# Patient Record
Sex: FEMALE | Race: WHITE
[De-identification: names, ages, dates, MRNs, and addresses within clinical notes are randomized per-mention and may not be internally consistent; named-entity substitution may affect disease eponyms.]

---

## 2018-01-30 ENCOUNTER — HOSPITAL ENCOUNTER (OUTPATIENT)
Dept: HOSPITAL 56 - MW.SDS | Age: 68
LOS: 1 days | Discharge: HOME | End: 2018-01-31
Attending: OBSTETRICS & GYNECOLOGY
Payer: COMMERCIAL

## 2018-01-30 DIAGNOSIS — N81.2: Primary | ICD-10-CM

## 2018-01-30 LAB
CHLORIDE SERPL-SCNC: 108 MMOL/L (ref 98–110)
SODIUM SERPL-SCNC: 142 MMOL/L (ref 136–146)

## 2018-01-30 PROCEDURE — 58552 LAPARO-VAG HYST INCL T/O: CPT

## 2018-01-30 PROCEDURE — 57240 ANTERIOR COLPORRHAPHY: CPT

## 2018-01-30 PROCEDURE — 86901 BLOOD TYPING SEROLOGIC RH(D): CPT

## 2018-01-30 PROCEDURE — 0UT27ZZ RESECTION OF BILATERAL OVARIES, VIA NATURAL OR ARTIFICIAL OPENING: ICD-10-PCS | Performed by: OBSTETRICS & GYNECOLOGY

## 2018-01-30 PROCEDURE — 36415 COLL VENOUS BLD VENIPUNCTURE: CPT

## 2018-01-30 PROCEDURE — 0JQC3ZZ REPAIR PELVIC REGION SUBCUTANEOUS TISSUE AND FASCIA, PERCUTANEOUS APPROACH: ICD-10-PCS | Performed by: OBSTETRICS & GYNECOLOGY

## 2018-01-30 PROCEDURE — 85025 COMPLETE CBC W/AUTO DIFF WBC: CPT

## 2018-01-30 PROCEDURE — 86900 BLOOD TYPING SEROLOGIC ABO: CPT

## 2018-01-30 PROCEDURE — 88307 TISSUE EXAM BY PATHOLOGIST: CPT

## 2018-01-30 PROCEDURE — 86850 RBC ANTIBODY SCREEN: CPT

## 2018-01-30 PROCEDURE — 0UT77ZZ RESECTION OF BILATERAL FALLOPIAN TUBES, VIA NATURAL OR ARTIFICIAL OPENING: ICD-10-PCS | Performed by: OBSTETRICS & GYNECOLOGY

## 2018-01-30 PROCEDURE — 85027 COMPLETE CBC AUTOMATED: CPT

## 2018-01-30 PROCEDURE — 0UT97ZZ RESECTION OF UTERUS, VIA NATURAL OR ARTIFICIAL OPENING: ICD-10-PCS | Performed by: OBSTETRICS & GYNECOLOGY

## 2018-01-30 PROCEDURE — 80048 BASIC METABOLIC PNL TOTAL CA: CPT

## 2018-01-30 RX ADMIN — FENTANYL CITRATE PRN MCG: 50 INJECTION, SOLUTION INTRAMUSCULAR; INTRAVENOUS at 15:14

## 2018-01-30 RX ADMIN — KETOROLAC TROMETHAMINE SCH MG: 15 INJECTION, SOLUTION INTRAMUSCULAR; INTRAVENOUS at 20:55

## 2018-01-30 RX ADMIN — FENTANYL CITRATE PRN MCG: 50 INJECTION, SOLUTION INTRAMUSCULAR; INTRAVENOUS at 15:21

## 2018-01-30 NOTE — OR
SURGEON:

Teresita Nance M.D.

 

DATE OF PROCEDURE:  01/30/2018

 

PREOPERATIVE DIAGNOSIS:

Symptomatic incomplete uterovaginal prolapse.

 

POSTOPERATIVE DIAGNOSIS:

Symptomatic incomplete uterovaginal prolapse.

 

PROCEDURE:

Total vaginal hysterectomy, bilateral salpingo-oophorectomy, anterior

colporrhaphy with cystoscopy.

 

PRIMARY SURGEON:

Teresita Nance M.D.

 

ASSISTANT:

Basia Fuentes M.D.

 

ANESTHESIA:

General endotracheal anesthesia.

 

FLUIDS:

1100 mL crystalloid.

 

ESTIMATED BLOOD LOSS:

100 mL.

 

COMPLICATIONS:

None known.

 

FINDINGS:

A normal-appearing uterus, atrophic tubes and ovaries, third-degree uterine

prolapse, second-degree cystocele.

 

Bilateral patent ureters with cystoscopy, post procedure.

 

DISPOSITION:

The patient to PACU, stable.

 

INDICATIONS:

Rosemary is a 67-year-old postmenopausal female who has ongoing difficulties with

symptomatic uterine prolapse.  On examination, she does have third-degree

uterine prolapse with second-degree cystocele.  The posterior defect is minimal.

 

After discussing the options for treatment, she would like to proceed with

surgical intervention.  Risks of the procedure have been discussed with her and

proper consent obtained.

 

DESCRIPTION OF PROCEDURE:

The patient was taken to the operating room where she underwent general

endotracheal anesthesia.  She was placed in modified dorsal lithotomy position,

prepped and draped in the usual sterile fashion.  SCDs to lower extremities.

Clay to gravity.  Received Ancef prophylactically.  Time-out was performed.

 

Weighted speculum, anterior [place], was placed in the vagina.  Cervix grasped

with Willard clamp, tented downward, cervix circumscribed with Bovie cautery.

Anterior and posteriorly overlying mucosa was dissected sharply and bluntly from

underlying muscle.  Posteriorly, the perineum was tented downward and entered

sharply.  Longer weighted speculum was replaced with the shorter.

 

Anteriorly, the anterior cul-de-sac was now gently entered and [place] was placed

to mobilize the bladder away from the operative field.

 

Using Francesco clamps, the uterosacral ligaments on either side were transected and

suture ligated with 2-0 Vicryl.  Remainder of the suture will be 2-0 Vicryl

unless otherwise specified.

 

Another pedicle was secured on either side followed by a final pedicle

incorporating the utero tubo-ovarian pedicle.

 

Cervix and uterus were handed off to scrub tech.  A moist packing lap was now

gently placed to mobilize the bowel away from the operative field.  The left

ovary and tube complex was able to be isolated with Marques clamp.  A Cornelius

clamp x2 was placed.  Tube and ovary were now excised, will be sent to

pathology.  A tie on a pass was gently placed followed by suture tie along this

pedicle.

 

Similar fashion was performed on the patient's right side.  The right ovary was

much more adherent than the left.  I was able to gently secure it with a Downs

clamp, transect x2 Z-clamps, removed the tube and ovary, and secured the pedicle

with a tie on a pass followed by a suture tie.

 

The area of bleeding along the apex of this pedicle was able to be secured with

a figure-of-eight suture.

 

The remainder of pedicles were closely inspected.  No bleeding was noted.

 

Packing lap was now removed.  Attention was turned to performing anterior

repair.  In the midline at the site of the anterior portion of the cuff, this

was grasped with Allis clamps on either side.  Hydrodissection was performed

along the midline and a midline anterior vaginal mucosa incision was created

with Metzenbaum scissors.  I was able to grasp the mucosa with serial Allis

clamps and the overlying mucosa was now sharply and bluntly dissected away from

underlying muscularis tissue until stronger muscularis tissue was found more

laterally.  The stronger muscularis tissue was now re-plicated using the 2-0

Vicryl in an inverted mattress suture technique.  Excess vaginal mucosa was

trimmed.  The vaginal mucosa was reapproximated using 0 Vicryl in continuous

running locked fashion.

 

The uterosacral ligament on either side was able to be secured to the vaginal

apex.  The remainder of the vaginal cuff was now closed using 0 Vicryl in

continuous running locked fashion.  The cuff line was inspected and found to be

hemostatic.  Clay catheter was removed after balloon was desufflated.

Cystoscope was introduced using normal saline as distention media.  The dome of

the bladder was able to be visualized followed by the trigone.  The right

ureteral orifice followed by the left ureteral orifice was able to be

visualized.  Fluorescein dyed urine was seen streaming from them, helping to

ensure patency.  The cystoscope was now removed.  Bladder was drained.  Clay

catheter was replaced.  The cuff line was once again inspected and found to be

hemostatic.  Vaginal packing was gently placed.  Sponge, instrument, and needle

counts correct x2.  The patient tolerated this procedure well overall.  She will

go to PACU in stable condition.  Specimens to pathology.

 

 

YOAN / NASIMA

DD:  01/30/2018 15:01:05

DT:  01/30/2018 22:05:59

Job #:  [id number]

## 2018-01-30 NOTE — PCM.PREANE
Preanesthetic Assessment





- Anesthesia/Transfusion/Family Hx


Anesthesia History: Prior Anesthesia Without Reaction


Family History of Anesthesia Reaction: No


Transfusion History: No Prior Transfusion(s)





- Review of Systems


General: No Symptoms


Pulmonary: No Symptoms


Cardiovascular: No Symptoms


Gastrointestinal: No Symptoms


Neurological: No Symptoms


Other: Reports: None





- Physical Assessment


NPO Status Date: 01/29/18


O2 Sat by Pulse Oximetry: 97


Respiratory Rate: 16


Vital Signs: 





 Last Vital Signs











Temp  36.5 C   01/30/18 10:25


 


Pulse  79   01/30/18 10:25


 


Resp  16   01/30/18 10:25


 


BP  129/94 H  01/30/18 10:25


 


Pulse Ox  97   01/30/18 10:25











Height: 1.57 m


Weight: 60.328 kg


ASA Class: 1


Mental Status: Alert & Oriented x3


Dentition: Reports: Normal Dentition


ROM/Head Extension: Full


Lungs: Clear to Auscultation, Normal Respiratory Effort


Cardiovascular: Regular Rate, Regular Rhythm





- Lab


Values: 





 Laboratory Last Values











WBC  4.44 K/uL (4.0-11.0)   01/30/18  10:49    


 


RBC  4.16 M/uL (4.30-5.90)  L  01/30/18  10:49    


 


Hgb  13.1 g/dL (12.0-16.0)   01/30/18  10:49    


 


Hct  39.2 % (36.0-46.0)   01/30/18  10:49    


 


MCV  94.2 fL (80.0-98.0)   01/30/18  10:49    


 


MCH  31.5 pg (27.0-32.0)   01/30/18  10:49    


 


MCHC  33.4 g/dL (31.0-37.0)   01/30/18  10:49    


 


RDW Std Deviation  47.0 fl (28.0-62.0)   01/30/18  10:49    


 


RDW Coeff of Erik  14 % (11.0-15.0)   01/30/18  10:49    


 


Plt Count  205 K/uL (150-400)   01/30/18  10:49    


 


MPV  10.20 fL (7.40-12.00)   01/30/18  10:49    


 


Nucleated RBC %  0.0 /100WBC  01/30/18  10:49    


 


Nucleated RBCs #  0 K/uL  01/30/18  10:49    














- Allergies


Allergies/Adverse Reactions: 


 Allergies











Allergy/AdvReac Type Severity Reaction Status Date / Time


 


No Known Allergies Allergy   Verified 01/30/18 10:30














- Anesthesia Plan


Pre-Op Medication Ordered: None





- Acknowledgements


Anesthesia Type Planned: General Anesthesia


Pt an Appropriate Candidate for the Planned Anesthesia: Yes


Alternatives and Risks of Anesthesia Discussed w Pt/Guardian: Yes


Pt/Guardian Understands and Agrees with Anesthesia Plan: Yes





PreAnesthesia Questionnaire


HEENT History: Reports: Cataract, Glaucoma, Hard of Hearing


Other HEENT History: wears glasses,  [place] left ear


Cardiovascular History: Reports: High Cholesterol


Musculoskeletal History: Reports: Fracture


Other Musculoskeletal History: hx of fx arm


Neurological History: Reports: Concussion


Psychiatric History: Reports: Depression





- Past Surgical History


HEENT Surgical History: Reports: Cataract Surgery, Other (See Below)


Other HEENT Surgeries/Procedures: removal of cholestatoma x3 left ear


GI Surgical History: Reports: Appendectomy, Colonoscopy


Female  Surgical History: Reports: Tubal Ligation, Other (See Below)


Other Female  Surgeries/Procedures: laparoscopy





- SUBSTANCE USE


Smoking Status *Q: Former Smoker


Recreational Drug Use History: No





- HOME MEDS


Home Medications: 


 Home Meds





Aspirin [Adult Low Dose Aspirin EC] 81 mg PO DAILY 01/25/18 [History]


Calcium Carbonate/Vitamin D3 [Calcium 600 + Vit D 400 Softgl] 1 cap PO DAILY 01/ 25/18 [History]


Cyanocobalamin (Vitamin B-12) [Vitamin B-12] 500 mcg PO DAILY 01/25/18 [History]


Latanoprost [Xalatan 0.005% Ophth Soln] 1 drop EYEBOTH BEDTIME 01/25/18 [History

]


Magnesium 250 mg PO DAILY 01/25/18 [History]


Multivitamin [Multivitamins] 1 tab PO DAILY 01/25/18 [History]


Sertraline [Zoloft] 100 mg PO DAILY 01/25/18 [History]


atorvaSTATin [Lipitor] 20 mg PO DAILY 01/25/18 [History]











- CURRENT (IN HOUSE) MEDS


Current Meds: 





 Current Medications





Sodium Chloride (Saline Flush)  10 ml FLUSH ASDIRECTED PRN


   PRN Reason: Keep Vein Open


Sodium Chloride (Saline Flush)  2.5 ml FLUSH ASDIRECTED PRN


   PRN Reason: Keep Vein Open





Discontinued Medications





Fluorescein Sodium (Ak-Fluor) Confirm Administered Dose 5 ml .ROUTE .STK-MED ONE


   Stop: 01/30/18 10:59


Cefazolin Sodium/Dextrose 1 gm (/ Premix)  50 mls @ 100 mls/hr IV ONETIME ONE


   Stop: 01/30/18 05:29

## 2018-01-30 NOTE — PCM.OPNOTE
- General Post-Op/Procedure Note


Date of Surgery/Procedure: 01/30/18


Operative Procedure(s): TVH/BSO/anterior repair/cystoscopy


Findings: 





3rd degree uterine prolapse, 2nd degree cystocele. Atrophic ovaries


Pre Op Diagnosis: Incomplete uterovaginal prolapse--symptomatic


Post-Op Diagnosis: Same


Anesthesia Technique: General LMA


Primary Surgeon: Teresita Nance


Assistant: Basia Fuentes


Pathology: 





uterus, tubes/ovaries. vaginal mucosa


Fluid Replacement, Intraop: 1,100


EBL in mLs: 100


Complications: none known


Condition: Good


Free Text/Narrative:: 





Dictation [id number]

## 2018-01-31 LAB
CHLORIDE SERPL-SCNC: 105 MMOL/L (ref 98–110)
SODIUM SERPL-SCNC: 138 MMOL/L (ref 136–146)

## 2018-01-31 RX ADMIN — KETOROLAC TROMETHAMINE SCH MG: 15 INJECTION, SOLUTION INTRAMUSCULAR; INTRAVENOUS at 02:52

## 2018-01-31 RX ADMIN — KETOROLAC TROMETHAMINE SCH MG: 15 INJECTION, SOLUTION INTRAMUSCULAR; INTRAVENOUS at 09:25

## 2018-01-31 NOTE — PCM.SURGPN
- General Info


Date of Service: 01/31/18


POD#: 1


Functional Status: Reports: Pain Controlled, Tolerating Diet, Ambulating





- Review of Systems


General: Denies: Fever, Fatigue


Pulmonary: Denies: Shortness of Breath


Cardiovascular: Denies: Chest Pain, Palpitations, Lightheadedness


Gastrointestinal: Reports: Flatus.  Denies: Abdominal Pain, Nausea, Vomiting


Genitourinary: Denies: Flank Pain


Psychiatric: Reports: Agitation





- Patient Data


Vitals - Most Recent: 


 Last Vital Signs











Temp  37.7 C   01/31/18 03:50


 


Pulse  67   01/31/18 00:00


 


Resp  15   01/31/18 03:50


 


BP  124/64   01/31/18 03:50


 


Pulse Ox  97   01/31/18 03:50











Weight - Most Recent: 60.328 kg


I&O - Last 24 Hours: 


 Intake & Output











 01/30/18 01/31/18 01/31/18





 22:59 06:59 14:59


 


Intake Total 3600 1900 


 


Output Total 1350 850 


 


Balance 2250 1050 











Lab Results Last 24 Hrs: 


 Laboratory Results - last 24 hr











  01/30/18 01/30/18 01/30/18 Range/Units





  10:49 10:49 10:49 


 


WBC  4.44    (4.0-11.0)  K/uL


 


RBC  4.16 L    (4.30-5.90)  M/uL


 


Hgb  13.1    (12.0-16.0)  g/dL


 


Hct  39.2    (36.0-46.0)  %


 


MCV  94.2    (80.0-98.0)  fL


 


MCH  31.5    (27.0-32.0)  pg


 


MCHC  33.4    (31.0-37.0)  g/dL


 


RDW Std Deviation  47.0    (28.0-62.0)  fl


 


RDW Coeff of Erik  14    (11.0-15.0)  %


 


Plt Count  205    (150-400)  K/uL


 


MPV  10.20    (7.40-12.00)  fL


 


Neut % (Auto)     (48.0-80.0)  %


 


Lymph % (Auto)     (16.0-40.0)  %


 


Mono % (Auto)     (0.0-15.0)  %


 


Eos % (Auto)     (0.0-7.0)  %


 


Baso % (Auto)     (0.0-1.5)  %


 


Neut # (Auto)     (1.4-5.7)  K/uL


 


Lymph # (Auto)     (0.6-2.4)  K/uL


 


Mono # (Auto)     (0.0-0.8)  K/uL


 


Eos # (Auto)     (0.0-0.7)  K/uL


 


Baso # (Auto)     (0.0-0.1)  K/uL


 


Nucleated RBC %  0.0    /100WBC


 


Nucleated RBCs #  0    K/uL


 


Sodium   142   (136-146)  mmol/L


 


Potassium   4.2   (3.5-5.1)  mmol/L


 


Chloride   108   ()  mmol/L


 


Carbon Dioxide   26   (21-31)  mmol/L


 


BUN   15   (6.0-23.0)  mg/dL


 


Creatinine   0.8   (0.6-1.5)  mg/dL


 


Est Cr Clr Drug Dosing   54.71   mL/min


 


Estimated GFR (MDRD)   > 60.0   ml/min


 


Glucose   105   ()  mg/dL


 


Calcium   9.8   (8.8-10.8)  mg/dL


 


Blood Type    O POSITIVE  


 


Antibody Screen    NEGATIVE  














  01/31/18 01/31/18 Range/Units





  04:55 04:55 


 


WBC  9.49   (4.0-11.0)  K/uL


 


RBC  3.87 L   (4.30-5.90)  M/uL


 


Hgb  12.0   (12.0-16.0)  g/dL


 


Hct  36.4   (36.0-46.0)  %


 


MCV  94.1   (80.0-98.0)  fL


 


MCH  31.0   (27.0-32.0)  pg


 


MCHC  33.0   (31.0-37.0)  g/dL


 


RDW Std Deviation  46.7   (28.0-62.0)  fl


 


RDW Coeff of Erik  14   (11.0-15.0)  %


 


Plt Count  207   (150-400)  K/uL


 


MPV  10.30   (7.40-12.00)  fL


 


Neut % (Auto)  74.6   (48.0-80.0)  %


 


Lymph % (Auto)  13.2 L   (16.0-40.0)  %


 


Mono % (Auto)  12.1   (0.0-15.0)  %


 


Eos % (Auto)  0.0   (0.0-7.0)  %


 


Baso % (Auto)  0.1   (0.0-1.5)  %


 


Neut # (Auto)  7.1 H   (1.4-5.7)  K/uL


 


Lymph # (Auto)  1.3   (0.6-2.4)  K/uL


 


Mono # (Auto)  1.2 H   (0.0-0.8)  K/uL


 


Eos # (Auto)  0.0   (0.0-0.7)  K/uL


 


Baso # (Auto)  0.0   (0.0-0.1)  K/uL


 


Nucleated RBC %  0.0   /100WBC


 


Nucleated RBCs #  0   K/uL


 


Sodium   138  (136-146)  mmol/L


 


Potassium   4.1  (3.5-5.1)  mmol/L


 


Chloride   105  ()  mmol/L


 


Carbon Dioxide   25  (21-31)  mmol/L


 


BUN   12  (6.0-23.0)  mg/dL


 


Creatinine   0.8  (0.6-1.5)  mg/dL


 


Est Cr Clr Drug Dosing   54.71  mL/min


 


Estimated GFR (MDRD)   > 60.0  ml/min


 


Glucose   129 H  ()  mg/dL


 


Calcium   9.2  (8.8-10.8)  mg/dL


 


Blood Type    


 


Antibody Screen    











Med Orders - Current: 


 Current Medications





Lactated Ringer's (Ringers, Lactated)  1,000 mls @ 125 mls/hr IV ASDIRECTED [facility]


   Last Admin: 01/30/18 21:00 Dose:  125 mls/hr


Ketorolac Tromethamine (Toradol)  15 mg IVPUSH Q6H [facility]


   Last Admin: 01/31/18 02:52 Dose:  15 mg


Morphine Sulfate (Morphine)  2 mg IVPUSH Q2H PRN


   PRN Reason: Pain (severe 7-10)


Morphine Sulfate (Morphine)  4 mg IVPUSH Q2H PRN


   PRN Reason: Pain (severe 7-10)


Ondansetron HCl (Zofran)  4 mg IVPUSH Q6H PRN


   PRN Reason: Nausea/Vomiting


Oxycodone/Acetaminophen (Percocet 325-5 Mg)  1 tab PO Q4H PRN


   PRN Reason: Pain (moderate 4-6)


Oxycodone/Acetaminophen (Percocet 325-5 Mg)  2 tab PO Q4H PRN


   PRN Reason: Pain (moderate 4-6)


   Last Admin: 01/30/18 16:19 Dose:  2 tab


Promethazine HCl (Phenergan)  25 mg IM Q6H PRN


   PRN Reason: Nausea/Vomiting


Sodium Chloride (Saline Flush)  10 ml FLUSH ASDIRECTED PRN


   PRN Reason: Keep Vein Open


Sodium Chloride (Saline Flush)  2.5 ml FLUSH ASDIRECTED PRN


   PRN Reason: Keep Vein Open





Discontinued Medications





Cefazolin Sodium (Ancef) Confirm Administered Dose 1 gm .ROUTE .STK-MED ONE


   Stop: 01/30/18 14:12


Dexamethasone (Dexamethasone) Confirm Administered Dose 20 mg .ROUTE .STK-MED 

ONE


   Stop: 01/30/18 14:12


Dexamethasone (Dexamethasone) Confirm Administered Dose 20 mg .ROUTE .STK-MED 

ONE


   Stop: 01/30/18 14:12


Fentanyl (Sublimaze) Confirm Administered Dose 100 mcg .ROUTE .STK-MED ONE


   Stop: 01/30/18 14:12


Fentanyl (Sublimaze)  50 mcg IVPUSH Q5M PRN


   PRN Reason: Pain (severe 7-10)


   Stop: 01/30/18 17:00


   Last Admin: 01/30/18 15:21 Dose:  50 mcg


Fluorescein Sodium (Ak-Fluor) Confirm Administered Dose 5 ml .ROUTE .STK-MED ONE


   Stop: 01/30/18 10:59


Furosemide (Lasix) Confirm Administered Dose 40 mg .ROUTE .STK-MED ONE


   Stop: 01/30/18 14:12


Furosemide (Lasix) Confirm Administered Dose 40 mg .ROUTE .STK-MED ONE


   Stop: 01/30/18 14:12


Hydromorphone HCl (Dilaudid)  2 mg IVPUSH ONETIME PRN


   PRN Reason: Abdominal Pain


   Stop: 01/30/18 17:00


Cefazolin Sodium/Dextrose 1 gm (/ Premix)  50 mls @ 100 mls/hr IV ONETIME ONE


   Stop: 01/30/18 05:29


Ketorolac Tromethamine (Toradol) Confirm Administered Dose 30 mg .ROUTE .STK-

MED ONE


   Stop: 01/30/18 14:12


Ketorolac Tromethamine (Toradol) Confirm Administered Dose 30 mg .ROUTE .STK-

MED ONE


   Stop: 01/30/18 14:12


Lidocaine (Xylocaine-Mpf 2%) Confirm Administered Dose 5 ml .ROUTE .STK-MED ONE


   Stop: 01/30/18 14:12


Lidocaine (Xylocaine-Mpf 2%) Confirm Administered Dose 5 ml .ROUTE .ST-MED ONE


   Stop: 01/30/18 14:12


Midazolam HCl (Versed 1 Mg/Ml) Confirm Administered Dose 2 mg .ROUTE .STK-MED 

ONE


   Stop: 01/30/18 14:12


Morphine Sulfate (Morphine) Confirm Administered Dose 10 mg .ROUTE .ST-MED ONE


   Stop: 01/30/18 14:12


Neostigmine Methylsulfate (Neostigmine) Confirm Administered Dose 5 mg .ROUTE 

.ST-MED ONE


   Stop: 01/30/18 14:46


Ondansetron HCl (Zofran) Confirm Administered Dose 4 mg .ROUTE .ST-MED ONE


   Stop: 01/30/18 14:12


Propofol (Diprivan  20 Ml) Confirm Administered Dose 200 mg .ROUTE .ST-MED ONE


   Stop: 01/30/18 14:12


Rocuronium Bromide (Zemuron) Confirm Administered Dose 100 mg .ROUTE .ST-MED 

ONE


   Stop: 01/30/18 14:12


Rocuronium Bromide (Zemuron) Confirm Administered Dose 100 mg .ROUTE .[facility]-MED 

ONE


   Stop: 01/30/18 14:12











- Exam


General: Alert, Oriented


Lungs: Normal Respiratory Effort


Cardiovascular: Regular Rate, Regular Rhythm


GI/Abdominal Exam: Normal Bowel Sounds, Soft, No Distention


Extremities: Non-Tender.  No: Edilia's Sign


Skin: Warm, Dry, Intact


Psy/Mental Status: Alert, Normal Affect





- Problem List & Annotations


(1) Incomplete uterovaginal prolapse


SNOMED Code(s): 756157584


   Code(s): N81.2 - INCOMPLETE UTEROVAGINAL PROLAPSE   Status: Acute   Current 

Visit: Yes   





- Problem List Review


Problem List Initiated/Reviewed/Updated: Yes





- My Orders


Last 24 Hours: 


 Active Orders 24 hr











 Category Date Time Status


 


 Patient Status [ADT] Routine ADT  01/30/18 14:49 Active


 


 Bradycardia-Neuroaxis [employer] [RC] ROUTINE Care  01/30/18 14:16 Active


 


 Hypertension-Neuroaxis [employer] [RC] ROUTINE Care  01/30/18 14:16 Active


 


 Hypotension-Neuroaxis [employer] [RC] ROUTINE Care  01/30/18 14:16 Active


 


 Notify Provider Intake and Out [RC] ASDIRECTED Care  01/30/18 14:49 Active


 


 Notify Provider Vital Signs [RC] ASDIRECTED Care  01/30/18 14:49 Active


 


 Oxygen Therapy [RC] ASDIRECTED Care  01/30/18 14:49 Active


 


 RT Incentive Spirometry [RC] Q2HWA Care  01/30/18 14:49 Active


 


 Ready for Discharge [RC] PER UNIT ROUTINE Care  01/31/18 09:03 Ordered


 


 Up With Assistance [RC] PER UNIT ROUTINE Care  01/30/18 14:49 Active


 


 Up ad [facility] [RC] PER UNIT ROUTINE Care  01/30/18 14:49 Active


 


 Urinary Catheter Removal [RC] Per Unit Routine Care  01/30/18 14:49 Active


 


 Vital Signs [RC] PER UNIT ROUTINE Care  01/30/18 14:49 Active


 


 Regular Diet [DIET] Diet  01/30/18 Dinner Active


 


 Acetaminophen/oxyCODONE [Percocet 325-5 MG] Med  01/30/18 14:49 Active





 1 tab PO Q4H PRN   


 


 Acetaminophen/oxyCODONE [Percocet 325-5 MG] Med  01/30/18 14:49 Active





 2 tab PO Q4H PRN   


 


 Ketorolac [Toradol] Med  01/30/18 15:00 Active





 15 mg IVPUSH Q6H   


 


 Lactated Ringers [Ringers, Lactated] 1,000 ml Med  01/30/18 15:00 Active





 IV ASDIRECTED   


 


 Morphine Med  01/30/18 14:49 Active





 2 mg IVPUSH Q2H PRN   


 


 Morphine Med  01/30/18 14:49 Active





 4 mg IVPUSH Q2H PRN   


 


 Ondansetron [Zofran] Med  01/30/18 14:49 Active





 4 mg IVPUSH Q6H PRN   


 


 Promethazine [Phenergan] Med  01/30/18 14:49 Active





 25 mg IM Q6H PRN   


 


 Peripheral IV Discontinue [OM.PC] Routine Oth  01/30/18 14:49 Ordered


 


 Sequential Compression Device [OM.PC] Per Unit Routine Oth  01/30/18 14:49 

Ordered


 


 Resuscitation Status Routine Resus Stat  01/30/18 14:49 Ordered








 Medication Orders





Lactated Ringer's (Ringers, Lactated)  1,000 mls @ 125 mls/hr IV ASDIRECTED [facility]


   Last Admin: 01/30/18 21:00  Dose: 125 mls/hr


Ketorolac Tromethamine (Toradol)  15 mg IVPUSH Q6H [facility]


   Last Admin: 01/31/18 02:52  Dose: 15 mg


   Admin: 01/30/18 20:55  Dose: 15 mg


Morphine Sulfate (Morphine)  2 mg IVPUSH Q2H PRN


   PRN Reason: Pain (severe 7-10)


Morphine Sulfate (Morphine)  4 mg IVPUSH Q2H PRN


   PRN Reason: Pain (severe 7-10)


Ondansetron HCl (Zofran)  4 mg IVPUSH Q6H PRN


   PRN Reason: Nausea/Vomiting


Oxycodone/Acetaminophen (Percocet 325-5 Mg)  1 tab PO Q4H PRN


   PRN Reason: Pain (moderate 4-6)


Oxycodone/Acetaminophen (Percocet 325-5 Mg)  2 tab PO Q4H PRN


   PRN Reason: Pain (moderate 4-6)


   Last Admin: 01/30/18 16:19  Dose: 2 tab


Promethazine HCl (Phenergan)  25 mg IM Q6H PRN


   PRN Reason: Nausea/Vomiting


Sodium Chloride (Saline Flush)  10 ml FLUSH ASDIRECTED PRN


   PRN Reason: Keep Vein Open


Sodium Chloride (Saline Flush)  2.5 ml FLUSH ASDIRECTED PRN


   PRN Reason: Keep Vein Open











- Assessment


Assessment           (Free Text/Narrative):: 





POD 1 status post TVH/BSO/Anterior repair





- Plan


Plan                        (Free Text/Narrative):: 





Labs and VS are reassuring. Reviewed intraoperative findings/procedure. Patient 

feeling very well and would like to go home later. Vaginal packing removed.  

May remove sin catheter. Once able to ambulate halls and void--may be 

discharged to home. Discharge instructions reviewed. infection and bleeding 

warnings reviewed.  Follow up at [facility] 2 and 6 weeks.

## 2020-12-01 NOTE — PCM.OPNOTE
- General Post-Op/Procedure Note


Date of Surgery/Procedure: 12/01/20


Operative Procedure(s): Screening colonoscopy


Findings: 


Diverticulosis throughout colon, transverse colon polyp 


Pre Op Diagnosis: Screening colonoscopy


Post-Op Diagnosis: Transverse colon polyp, diverticulosis


Anesthesia Technique: MAC


Primary Surgeon: Etta Campbell


Condition: Good

## 2020-12-01 NOTE — [id number]
Post Anesthesia Note





- EVALUATION WITHIN 48HRS OF ANESTHETIC


Vital Signs in Normal Range: Yes


Patient Participated in Evaluation: Yes


Respiratory Function Stable: Yes


Airway Patent: Yes


Cardiovascular Function Stable: Yes


Hydration Status Stable: Yes


Pain Control Satisfactory: Yes


Nausea and Vomiting Control Satisfactory: Yes


Mental Status Recovered: Yes


Vital Signs: 


                                Last Vital Signs











Temp  97.2 F   12/01/20 08:33


 


Pulse  64   12/01/20 08:33


 


Resp  14   12/01/20 08:33


 


BP  129/64   12/01/20 08:33


 


Pulse Ox  97   12/01/20 08:33

## 2020-12-01 NOTE — PCM.[email or web address]
POST ANESTHESIA ASSESSMENT





- MENTAL STATUS


Mental Status: Alert, Oriented





- VITAL SIGNS


Vital Signs: 


                                Last Vital Signs











Temp  97.2 F   12/01/20 08:33


 


Pulse  64   12/01/20 08:33


 


Resp  14   12/01/20 08:33


 


BP  129/64   12/01/20 08:33


 


Pulse Ox  97   12/01/20 08:33














- RESPIRATORY


Respiratory Status: Respiratory Rate WNL, Airway Patent, O2 Saturation Stable





- CARDIOVASCULAR


CV Status: Pulse Rate WNL, Blood Pressure Stable





- GASTROINTESTINAL


GI Status: No Symptoms





- POST OP HYDRATION


Hydration Status: Adequate & Stable

## 2020-12-01 NOTE — OR
SURGEON:

ETTA CAMPBELL MD

 

DATE OF PROCEDURE:  12/01/2020

 

PREOPERATIVE DIAGNOSIS:

Screening colonoscopy.

 

POSTOPERATIVE DIAGNOSES:

1. Transverse colon polyp.

2. Diverticulosis.

 

PROCEDURE PERFORMED:

Screening colonoscopy with polypectomy.

 

PRIMARY SURGEON:

Etta Campbell MD

 

ANESTHESIA:

MAC.

 

INSTRUMENT USED:

Olympus colonoscope.

 

EXTENT OF EXAM:

To the cecum.

 

PREPARATION:

Good.

 

LIMITATIONS:

None.

 

INDICATIONS FOR EXAMINATION:

The patient is a 70-year-old female who presents for screening colonoscopy.  We

discussed the procedure; expected perioperative course; and the risks including

bleeding, infection, or damage to surrounding structures.  The patient

verbalized understanding and wishes to proceed.

 

PROCEDURE IN DETAIL:

The patient was brought into the endoscopy suite and placed in the left lateral

decubitus position.  A time-out was completed verifying the patient's name, age,

date of birth, allergies, and procedure to be performed.  Monitored anesthesia

care was induced and continuous oxygen was provided via nasal cannula throughout

the procedure.  After adequate sedation was achieved, a digital rectal exam was

performed.  This exam was within normal limits.  A well-lubricated colonoscope

was inserted in the rectum and advanced under direct visualization to the level

of cecum.  Cecum was identified by both visual and anatomic landmarks.  A

photograph was taken of the cecal cap as well as with the scope retroflexed

within the cecum.  The scope was then fully withdrawn while examining the color,

texture, anatomy, and integrity of the mucosa from the cecum to the anal canal.

The patient was found to have a transverse colon polyp.  This was small and

sessile.  It was removed in piecemeal fashion using cold biopsy forceps.  The

patient was noted to have diverticulosis in the sigmoid colon.  A photograph of

this was taken.  The scope was then brought into the rectum and retroflexed to

allow visualization of the anal canal opening.  This appeared normal and a

photograph was taken.  The scope was then straightened out and fully withdrawn.

The cecum to anus time was 6 minutes.  The patient tolerated the procedure well

and was transferred to the PACU in stable condition.

 

ENDOSCOPIC DIAGNOSES:

Transverse colon polyp, diverticulosis.

 

RECOMMENDATIONS:

Follow up in clinic in 2 weeks.

 

 

BAUTISTA DEL REAL

DD:  12/01/2020 14:56:30

DT:  12/01/2020 15:42:12

Job #:  [id number]

## 2020-12-01 NOTE — PCM.PREANE
Preanesthetic Assessment





- Anesthesia/Transfusion/Family Hx


Anesthesia History: Prior Anesthesia Without Reaction


Family History of Anesthesia Reaction: No


Transfusion History: No Prior Transfusion(s)





- Review of Systems


General: No Symptoms


Pulmonary: No Symptoms


Cardiovascular: No Symptoms


Gastrointestinal: No Symptoms


Neurological: No Symptoms


Other: Reports: None





- Physical Assessment


NPO Status Date: 11/30/20


Vital Signs: 





                                Last Vital Signs











Temp  97.2 F   12/01/20 06:36


 


Pulse  80   12/01/20 06:36


 


Resp  16   12/01/20 06:36


 


BP  121/84   12/01/20 06:36


 


Pulse Ox  99   12/01/20 06:36











Height: 5 ft 3 in


Weight: 64.864 kg


ASA Class: 1


Mental Status: Alert & Oriented x3


Airway Class: Mallampati = 1


Dentition: Reports: Normal Dentition


ROM/Head Extension: Full


Lungs: Clear to Auscultation, Normal Respiratory Effort


Cardiovascular: Regular Rate, Regular Rhythm





- Allergies


Allergies/Adverse Reactions: 


                                    Allergies











Allergy/AdvReac Type Severity Reaction Status Date / Time


 


No Known Allergies Allergy   Verified 11/25/20 12:21














- Blood


Blood Available: No





- Anesthesia Plan


Pre-Op Medication Ordered: None





- Acknowledgements


Anesthesia Type Planned: General Anesthesia (tiva)


Pt an Appropriate Candidate for the Planned Anesthesia: Yes


Alternatives and Risks of Anesthesia Discussed w Pt/Guardian: Yes


Pt/Guardian Understands and Agrees with Anesthesia Plan: Yes





PreAnesthesia Questionnaire


HEENT History: Reports: Cataract, Glaucoma, Hard of Hearing


Other HEENT History: wears glasses,  [place] left ear


Cardiovascular History: Reports: High Cholesterol


Respiratory History: Reports: None


Genitourinary History: Reports: None


Musculoskeletal History: Reports: Fracture


Other Musculoskeletal History: hx of fx arm


Neurological History: Reports: Concussion


Psychiatric History: Reports: Depression


Endocrine/Metabolic History: Reports: None


Hematologic History: Reports: None


Immunologic History: Reports: None


Oncologic (Cancer) History: Reports: None


Dermatologic History: Reports: None





- Infectious Disease History


Infectious Disease History: Reports: Chicken Pox, Measles


Other Infectious Disease History: as a child





- Past Surgical History


Head Surgeries/Procedures: Reports: None


HEENT Surgical History: Reports: Cataract Surgery, Other (See Below)


Other HEENT Surgeries/Procedures: removal of cholestatoma x3 left ear


Cardiovascular Surgical History: Reports: None


Respiratory Surgical History: Reports: None


GI Surgical History: Reports: Appendectomy, Colonoscopy


Female  Surgical History: Reports: Hysterectomy, Tubal Ligation, Other (See 

Below)


Other Female  Surgeries/Procedures: laparoscopy


Endocrine Surgical History: Reports: None


Neurological Surgical History: Reports: None


Oncologic Surgical History: Reports: None





- SUBSTANCE USE


Tobacco Use Status *Q: Former Tobacco User





- HOME MEDS


Home Medications: 


                                    Home Meds





Aspirin [Adult Low Dose Aspirin EC] 81 mg PO DAILY 01/25/18 [History]


Calcium Carbonate/Vitamin D3 [Calcium 600 + Vit D 400 Softgl] 1 cap PO DAILY 

01/25/18 [History]


Cyanocobalamin (Vitamin B-12) [Vitamin B-12] 500 mcg PO DAILY 01/25/18 [History]


Latanoprost [Xalatan 0.005% Ophth Soln] 1 drop EYEBOTH BEDTIME 01/25/18 

[History]


Magnesium 250 mg PO DAILY 01/25/18 [History]


Multivitamin [Multivitamins] 1 tab PO DAILY 01/25/18 [History]


Sertraline [Zoloft] 100 mg PO DAILY 01/25/18 [History]


atorvaSTATin [Lipitor] 20 mg PO DAILY 01/25/18 [History]











- CURRENT (IN HOUSE) MEDS


Current Meds: 





                               Current Medications





Lactated Ringer's (Ringers, Lactated)  1,000 mls @ 125 mls/hr IV ASDIRECTED HITESH


   Last Admin: 12/01/20 07:00 Dose:  125 mls/hr


   Documented by: 


Sodium Chloride (Saline Flush)  10 ml FLUSH ASDIRECTED PRN


   PRN Reason: Keep Vein Open


Sodium Chloride (Saline Flush)  2.5 ml FLUSH ASDIRECTED PRN


   PRN Reason: Keep Vein Open


Sodium Chloride (Saline Flush)  10 ml FLUSH ASDIRECTED PRN


   PRN Reason: Keep Vein Open


Sodium Chloride (Saline Flush)  2.5 ml FLUSH ASDIRECTED PRN


   PRN Reason: Keep Vein Open


Sodium Chloride (Normal Saline)  10 ml IV ASDIRECTED PRN


   PRN Reason: IV Use





Discontinued Medications





Fentanyl (Sublimaze) Confirm Administered Dose 100 mcg .ROUTE .STK-MED ONE


   Stop: 12/01/20 07:12


Lidocaine (Xylocaine-Mpf 2%) Confirm Administered Dose 5 ml .ROUTE .STK-MED ONE


   Stop: 12/01/20 07:12


Propofol (Diprivan  20 Ml) Confirm Administered Dose 400 mg .ROUTE .STK-MED ONE


   Stop: 12/01/20 07:12